# Patient Record
Sex: MALE | Race: WHITE | NOT HISPANIC OR LATINO | Employment: OTHER | URBAN - METROPOLITAN AREA
[De-identification: names, ages, dates, MRNs, and addresses within clinical notes are randomized per-mention and may not be internally consistent; named-entity substitution may affect disease eponyms.]

---

## 2017-01-03 RX ORDER — ALLOPURINOL 300 MG/1
TABLET ORAL
Qty: 90 TABLET | Refills: 3 | Status: SHIPPED | OUTPATIENT
Start: 2017-01-03

## 2017-01-03 RX ORDER — AMLODIPINE BESYLATE 10 MG/1
TABLET ORAL
Qty: 90 TABLET | Refills: 3 | Status: SHIPPED | OUTPATIENT
Start: 2017-01-03 | End: 2017-07-26 | Stop reason: ALTCHOICE

## 2017-01-17 ENCOUNTER — TELEPHONE (OUTPATIENT)
Dept: FAMILY MEDICINE CLINIC | Facility: CLINIC | Age: 72
End: 2017-01-17

## 2017-01-18 NOTE — TELEPHONE ENCOUNTER
Blood sugar is fine.  Heart rate is high, is BP okay?   Is he having symptoms of heart racing, light headed?   Chest pain? Shortness of breath?  Is his pulse irregular?

## 2017-01-23 ENCOUNTER — TELEPHONE (OUTPATIENT)
Dept: FAMILY MEDICINE CLINIC | Facility: CLINIC | Age: 72
End: 2017-01-23

## 2017-03-14 RX ORDER — PRAVASTATIN SODIUM 80 MG/1
TABLET ORAL
Qty: 90 TABLET | Refills: 3 | Status: SHIPPED | OUTPATIENT
Start: 2017-03-14

## 2017-04-21 DIAGNOSIS — E11.21 TYPE 2 DIABETES WITH NEPHROPATHY (HCC): ICD-10-CM

## 2017-04-21 RX ORDER — GLUCOSAMINE HCL/CHONDROITIN SU 500-400 MG
CAPSULE ORAL
Qty: 100 EACH | Refills: 12 | Status: SHIPPED | OUTPATIENT
Start: 2017-04-21

## 2017-04-21 RX ORDER — GLUCOSAMINE HCL/CHONDROITIN SU 500-400 MG
CAPSULE ORAL
Qty: 100 EACH | Refills: 12 | Status: SHIPPED | OUTPATIENT
Start: 2017-04-21 | End: 2017-04-21 | Stop reason: SDUPTHER

## 2017-04-26 ENCOUNTER — TELEPHONE (OUTPATIENT)
Dept: FAMILY MEDICINE CLINIC | Facility: CLINIC | Age: 72
End: 2017-04-26

## 2017-04-27 ENCOUNTER — TELEPHONE (OUTPATIENT)
Dept: FAMILY MEDICINE CLINIC | Facility: CLINIC | Age: 72
End: 2017-04-27

## 2017-06-26 RX ORDER — FUROSEMIDE 40 MG/1
TABLET ORAL
Qty: 180 TABLET | Refills: 3 | Status: SHIPPED | OUTPATIENT
Start: 2017-06-26 | End: 2017-10-16 | Stop reason: ALTCHOICE

## 2017-06-29 ENCOUNTER — OFFICE VISIT (OUTPATIENT)
Dept: FAMILY MEDICINE CLINIC | Facility: CLINIC | Age: 72
End: 2017-06-29

## 2017-06-29 VITALS
BODY MASS INDEX: 32.1 KG/M2 | TEMPERATURE: 97.8 F | SYSTOLIC BLOOD PRESSURE: 140 MMHG | HEART RATE: 82 BPM | WEIGHT: 250 LBS | DIASTOLIC BLOOD PRESSURE: 64 MMHG | OXYGEN SATURATION: 94 %

## 2017-06-29 DIAGNOSIS — N18.30 CHRONIC KIDNEY DISEASE, STAGE III (MODERATE) (HCC): ICD-10-CM

## 2017-06-29 DIAGNOSIS — N18.30 TYPE 2 DIABETES MELLITUS WITH STAGE 3 CHRONIC KIDNEY DISEASE, WITH LONG-TERM CURRENT USE OF INSULIN (HCC): Primary | ICD-10-CM

## 2017-06-29 DIAGNOSIS — I48.0 PAROXYSMAL ATRIAL FIBRILLATION (HCC): ICD-10-CM

## 2017-06-29 DIAGNOSIS — C34.90 NON-SMALL CELL CARCINOMA OF LUNG, STAGE 4, UNSPECIFIED LATERALITY (HCC): ICD-10-CM

## 2017-06-29 DIAGNOSIS — E11.22 TYPE 2 DIABETES MELLITUS WITH STAGE 3 CHRONIC KIDNEY DISEASE, WITH LONG-TERM CURRENT USE OF INSULIN (HCC): Primary | ICD-10-CM

## 2017-06-29 DIAGNOSIS — E78.5 DYSLIPIDEMIA: ICD-10-CM

## 2017-06-29 DIAGNOSIS — Z79.4 TYPE 2 DIABETES MELLITUS WITH STAGE 3 CHRONIC KIDNEY DISEASE, WITH LONG-TERM CURRENT USE OF INSULIN (HCC): Primary | ICD-10-CM

## 2017-06-29 DIAGNOSIS — I10 BENIGN ESSENTIAL HYPERTENSION: ICD-10-CM

## 2017-06-29 DIAGNOSIS — J44.9 COPD WITH CHRONIC BRONCHITIS (HCC): ICD-10-CM

## 2017-06-29 PROBLEM — D50.9 IRON DEFICIENCY ANEMIA: Status: ACTIVE | Noted: 2017-04-10

## 2017-06-29 PROBLEM — K90.9 IRON MALABSORPTION: Status: ACTIVE | Noted: 2017-04-10

## 2017-06-29 PROBLEM — R91.8 LUNG MASS: Status: ACTIVE | Noted: 2017-01-20

## 2017-06-29 PROBLEM — R93.1 ECHOCARDIOGRAM ABNORMAL: Status: ACTIVE | Noted: 2017-01-20

## 2017-06-29 PROBLEM — I31.39 PERICARDIAL EFFUSION: Status: ACTIVE | Noted: 2017-01-20

## 2017-06-29 PROBLEM — J90 PLEURAL EFFUSION: Status: ACTIVE | Noted: 2017-01-26

## 2017-06-29 PROBLEM — S37.009A INJURY OF KIDNEY: Status: ACTIVE | Noted: 2017-01-19

## 2017-06-29 LAB
CHOLEST SERPL-MCNC: 108 MG/DL (ref 0–200)
HBA1C MFR BLD: 6.9 % (ref 4.8–5.6)
HDLC SERPL-MCNC: 39 MG/DL (ref 40–60)
LDLC SERPL CALC-MCNC: 51 MG/DL (ref 0–100)
TRIGL SERPL-MCNC: 89 MG/DL (ref 0–150)
VLDLC SERPL CALC-MCNC: 17.8 MG/DL (ref 5–40)

## 2017-06-29 PROCEDURE — 99214 OFFICE O/P EST MOD 30 MIN: CPT | Performed by: FAMILY MEDICINE

## 2017-06-29 RX ORDER — WARFARIN SODIUM 5 MG/1
TABLET ORAL
Start: 2017-06-29 | End: 2017-08-25 | Stop reason: SDUPTHER

## 2017-06-29 NOTE — PROGRESS NOTES
Subjective   Jose Miguel Faustin is a 71 y.o. male. Presents today for   Chief Complaint   Patient presents with   • Follow-up     med check had pet scan x 1 week ago and lymph nodes in neck swollen and said it hasn't grown any.  Getting 4 more tx of chemo and then another scan.   • Diabetes   • Hyperlipidemia   • Hypertension   • Chronic Kidney Disease   • COPD     Has stage IV lung cancer non-small cell       Diabetes   He presents for his follow-up diabetic visit. He has type 2 diabetes mellitus. His disease course has been stable. Pertinent negatives for hypoglycemia include no dizziness, headaches, speech difficulty or tremors. Pertinent negatives for diabetes include no blurred vision, no chest pain, no foot paresthesias, no foot ulcerations and no weakness. Symptoms are stable. Diabetic complications include nephropathy (Sees Dr. Bailey for management.). Current diabetic treatment includes oral agent (dual therapy), diet and insulin injections. He is compliant with treatment all of the time. His weight is stable. He is following a diabetic diet. Home blood sugar record trend: 98 to 120. An ACE inhibitor/angiotensin II receptor blocker is not being taken.   Hypertension   This is a chronic problem. The current episode started more than 1 year ago. The problem is unchanged. The problem is controlled. Pertinent negatives include no blurred vision, chest pain, headaches, orthopnea, palpitations, peripheral edema, PND or shortness of breath. There are no associated agents to hypertension. Risk factors for coronary artery disease include dyslipidemia, diabetes mellitus and male gender. Past treatments include calcium channel blockers and direct vasodilators. The current treatment provides moderate improvement. Hypertensive end-organ damage includes kidney disease and heart failure. Identifiable causes of hypertension include chronic renal disease.   Hyperlipidemia   This is a chronic problem. The current episode started  more than 1 year ago. The problem is controlled. Recent lipid tests were reviewed and are normal. Exacerbating diseases include chronic renal disease and diabetes. There are no known factors aggravating his hyperlipidemia. Pertinent negatives include no chest pain, focal sensory loss, focal weakness, myalgias or shortness of breath. Current antihyperlipidemic treatment includes statins. The current treatment provides moderate improvement of lipids. There are no compliance problems.  Risk factors for coronary artery disease include dyslipidemia, diabetes mellitus, hypertension, male sex and obesity.   COPD   This is a chronic problem. The current episode started more than 1 year ago. The problem occurs intermittently. The problem has been unchanged. Associated symptoms include coughing. Pertinent negatives include no abdominal pain, chest pain, headaches, myalgias, nausea, numbness, vomiting or weakness. Associated symptoms comments: Breathing, cough stable;  Needs samples of spiriva, do not have;  Gave Stiolito. Treatments tried: on oxygen continuous now;  Neb and spiriva. The treatment provided moderate relief.   Atrial Fibrillation   Presents for follow-up visit. Symptoms are negative for bradycardia, chest pain, dizziness, hemodynamic instability, hypertension, hypotension, palpitations, shortness of breath, syncope and weakness. (ON coumadin, mgmt per cardiology) The symptoms have been stable. Past medical history includes atrial fibrillation and hyperlipidemia.       6/22/17 PET scan Skull base to midthigh  IMPRESSION:   1. Large right solid irregular hilar mass which is intensely hypermetabolic and is likely the patient's known primary neoplasm. There is fairly extensive middle mediastinal hypermetabolic lymphadenopathy as well as a hypermetabolic lymph node in the left   hilum. There is also hypermetabolic lymphadenopathy in the right neck in the posterior triangle and supraclavicular regions.  2. Air-fluid  level in the right maxillary sinus worrisome for acute sinusitis.  3. Sclerotic foci in the right iliac bone without associated increased tracer uptake. This could represent a bone island however a bone scan could be performed for further evaluation to exclude osseous metastasis.    Reports opted to not try new chemo as doesn't want to be sick and tolerating current treatment.    Review of Systems   Constitutional: Unexpected weight change: Weight gain or loss.   Eyes: Negative for blurred vision.   Respiratory: Positive for cough. Negative for shortness of breath and wheezing.    Cardiovascular: Negative for chest pain, palpitations, orthopnea, leg swelling, syncope and PND.   Gastrointestinal: Negative for abdominal pain, nausea and vomiting.   Musculoskeletal: Negative for myalgias.   Neurological: Negative for dizziness, tremors, focal weakness, syncope, facial asymmetry, speech difficulty, weakness, light-headedness, numbness and headaches.       The following portions of the patient's history were reviewed and updated as appropriate: allergies, current medications, past medical history and problem list.    Patient Active Problem List   Diagnosis   • Anemia   • Atrial fibrillation   • Benign essential hypertension   • Gastroesophageal reflux disease with esophagitis   • Chronic kidney disease, stage III (moderate)   • Congestive heart failure   • COPD with chronic bronchitis   • Type 2 diabetes mellitus with chronic kidney disease, with long-term current use of insulin   • Dry skin dermatitis   • Shortness of breath   • Edema   • Chronic gout of multiple sites   • Hyperlipidemia   • Hypoxia   • Adiposity   • Obstructive sleep apnea syndrome   • Pulmonary hypertension   • Reactive airway disease   • Tinea pedis   • Chronic obstructive pulmonary disease   • Left heart failure   • Venous stasis   • Injury of kidney   • Echocardiogram abnormal   • Iron deficiency anemia   • Pericardial effusion   • Lung mass   •  Iron malabsorption   • Non-small cell carcinoma of lung, stage 4   • Pleural effusion       No Known Allergies    Current Outpatient Prescriptions on File Prior to Visit   Medication Sig Dispense Refill   • albuterol (PROVENTIL) (2.5 MG/3ML) 0.083% nebulizer solution Albuterol Sulfate (2.5 MG/3ML) 0.083% Inhalation Nebulization Solution; Patient Sig: Albuterol Sulfate (2.5 MG/3ML) 0.083% Inhalation Nebulization Solution USE 1 UNIT DOSE IN NEBULIZER EVERY 4 TO 6 HOURS AS NEEDED.; 1; 11; 28-Apr-2014; Active     • allopurinol (ZYLOPRIM) 300 MG tablet TAKE 1 TABLET DAILY 90 tablet 3   • amLODIPine (NORVASC) 10 MG tablet TAKE 1 TABLET EVERY DAY 90 tablet 3   • BIOTIN 5000 PO Take  by mouth.     • Calcium Citrate-Vitamin D (CALCIUM CITRATE + D3 PO) Take  by mouth.     • Cholecalciferol (VITAMIN D3) 5000 UNITS capsule capsule Take 5,000 Units by mouth daily.     • Chromium-Cinnamon (CINNAMON PLUS CHROMIUM PO) Take  by mouth.     • Ferrous Gluconate 325 (36 FE) MG tablet Take  by mouth.     • furosemide (LASIX) 40 MG tablet TAKE 1 TABLET TWICE DAILY 180 tablet 3   • glipiZIDE (GLUCOTROL) 10 MG tablet TAKE 2 TABLETS BEFORE BREAKFAST AND TAKE 1 TABLET BEFORE SUPPER 270 tablet 3   • Glucosamine-Chondroit-Vit C-Mn (GLUCOSAMINE CHONDR 1500 COMPLX PO) Take  by mouth.     • Glucose Blood (BLOOD GLUCOSE TEST) strip Check twice daily. Disp Dm2 testing supplies as needs.  Dx: E11.9 Type 2 diabetes on long-term insulin use. NPI [id number]. 100 each 12   • glucose blood test strip 1 each by Other route 2 (Two) Times a Day. 100 each 3   • hydrALAZINE (APRESOLINE) 100 MG tablet Take 1 tablet by mouth.     • insulin glargine (LANTUS) 100 UNIT/ML injection Inject 8 Units under the skin every night (Patient taking differently: Inject 10 Units under the skin Every Night.) 1 each 12   • Lancet Devices (ACCU-CHEK SOFTCLIX) lancets Test bid 100 each 3   • Multiple Vitamins-Minerals (MULTIVITAMIN ADULT PO) Take  by mouth.     • Omega-3 Fatty  Acids (FISH OIL) 1000 MG capsule capsule Take  by mouth daily with breakfast.     • potassium chloride (K-DUR,KLOR-CON) 20 MEQ CR tablet take 1 tablet by mouth once daily  0   • pravastatin (PRAVACHOL) 80 MG tablet TAKE 1 TABLET EVERY DAY 90 tablet 3   • saxagliptin (ONGLYZA) 5 MG tablet Take  by mouth.     • tiotropium (SPIRIVA) 18 MCG per inhalation capsule daily.     • vitamin E 400 UNIT capsule Take 400 Units by mouth daily.       No current facility-administered medications on file prior to visit.        Objective   Vitals:    06/29/17 0757   BP: 140/64   Pulse: 82   Temp: 97.8 °F (36.6 °C)   SpO2: 94%   Weight: 250 lb (113 kg)       Physical Exam   Constitutional: He appears well-developed and well-nourished.   HENT:   Head: Normocephalic and atraumatic.   Neck: Neck supple. No JVD present. No thyromegaly present.   Cardiovascular: Normal rate, regular rhythm and normal heart sounds.  Exam reveals no gallop and no friction rub.    No murmur heard.  Pulmonary/Chest: Effort normal and breath sounds normal. No respiratory distress. He has no wheezes. He has no rales.   Abdominal: Soft. Bowel sounds are normal. He exhibits no distension. There is no tenderness. There is no rebound and no guarding.   Musculoskeletal: He exhibits no edema.   Neurological: He is alert.   Skin: Skin is warm and dry.   Psychiatric: He has a normal mood and affect. His behavior is normal.   Nursing note and vitals reviewed.    Thyroid Stimulating Hormone(TSH) (06/26/2017 9:22 AM)  Thyroid Stimulating Hormone(TSH) (06/26/2017 9:22 AM)   Component Value Ref Range   Thyroid Stimulating Hormone (TSH)3rd Gen 1.070 0.470 - 4.680 u(iU)/mL     Thyroid Stimulating Hormone(TSH) (06/26/2017 9:22 AM)   Specimen Performing Laboratory   Plasma - Blood [facility] ([id number])    [place] [address]     Back to top of Lab Results      Comprehensive Metabolic Panel (CMP) (06/26/2017 9:22 AM)  Comprehensive Metabolic Panel  (CMP) (06/26/2017 9:22 AM)   Component Value Ref Range   Sodium 140 137 - 145 mmol/L   Potassium 3.8 3.5 - 5.1 mmol/L   Chloride 103 98 - 107 mmol/L   Carbon Dioxide 29 22 - 30 mmol/L   Glucose 144 (H) 74 - 106 mg/dL   Blood Urea Nitrogen (BUN) 34 (H) 7 - 20 mg/dL   Creatinine-Blood 1.6 (H) 0.7 - 1.5 mg/dL   BUN/Creatinine Ratio 21.3 RATIO   Total Protein 6.4 6.3 - 8.2 g/dL   Albumin 3.4 (L) 3.5 - 5.0 g/dL   Globulin 3.0 1.5 - 4.5 g/dL   Albumin/Globulin Ratio 1.1 1.1 - 2.5 RATIO   Calcium 9.1 8.4 - 10.2 mg/dL   Total Bilirubin 0.6 0.2 - 1.3 mg/dL   AST/SGOT 21 15 - 46 U/L   ALT/SGPT 26 13 - 69 U/L   Alkaline Phosphatase 83 38 - 126 U/L   Estimated GFR 43 (L)Comment: Multiply by 1.212 if . Results provided are valid only for patients who are 18 to 70 years of age. Results do not take into account body mass. >60 /1.73 m2     Comprehensive Metabolic Panel (CMP) (06/26/2017 9:22 AM)   Specimen Performing Laboratory   [facility]    [address]     [address]    [place] [address]     Back to top of Lab Results      CBC w/Diff (06/26/2017 9:22 AM)  CBC w/Diff (06/26/2017 9:22 AM)   Component Value Ref Range   White Blood Count 5.98 4.5 - 11.0 10*3/uL   Red Blood Count 3.72 (L) 4.5 - 5.9 10*6/uL   Hemoglobin 10.9 (L) 13.5 - 17.5 g/dL   Hematocrit 34.4 (L) 41.0 - 53.0 %   Mean Corpuscular Volume 92.5 80.0 - 100.0 fL   Mean Corpuscular Hemoglobin 29.3 26.0 - 34.0 pg   Mean Corpuscular HGB Conc 31.7 31.0 - 37.0 g/dL   Red Cell Distribution Width-CV 16.7 12.0 - 16.8 %   Platelet Count 144 140 - 440 10*3/uL   Mean Platelet Volume 9.5 7.8 - 11.0 fL   Neutrophils % 67.5 45 - 80 %   Lymphocyte % 14.5 (L) 15 - 50 %   Monocyte % 10.0 0 - 15 %   Eosinophil% 7.5 (H) 0 - 7 %   BASO% 0.5 0 - 2 %   Neutrophil Abs 4.03 2.0 - 8.8 10*3/uL   Lymphocyte-Absolute 0.87 (L) 1.2 - 3.4 10*3/uL   Monocyte Absolute 0.60 0.0 - 1.7 10*3/uL   EOS-Absolute 0.45 0.0 - 0.8 10*3/uL   Basophil Abs 0.03 0.0 - 0.2  10*3/uL       Assessment/Plan   Jose Miguel was seen today for follow-up, diabetes, hyperlipidemia, hypertension, chronic kidney disease and copd.    Diagnoses and all orders for this visit:    Type 2 diabetes mellitus with stage 3 chronic kidney disease, with long-term current use of insulin  -     Lipid Panel  -     Hemoglobin A1c    Benign essential hypertension    COPD with chronic bronchitis    Non-small cell carcinoma of lung, stage 4, unspecified laterality    Chronic kidney disease, stage III (moderate)    Paroxysmal atrial fibrillation  -     warfarin (COUMADIN) 5 MG tablet; Currently on 7mg daily, mgmt by Cardiology    Dyslipidemia    -COPD - gave stilito 2 puffs daily; Continue neb;  F/u with oncology for lung cancer;  Holding weight  -dm2 - stable, add on a1c  -HLD - continue statin, recheck lipids.  -a. Fib stable  -hypertension - controlled, continue medications  -CKD - tight blood pressure, blood sugar control and avoid nsaids.           -Follow up: 6 months       Current Outpatient Prescriptions:   •  albuterol (PROVENTIL) (2.5 MG/3ML) 0.083% nebulizer solution, Albuterol Sulfate (2.5 MG/3ML) 0.083% Inhalation Nebulization Solution; Patient Sig: Albuterol Sulfate (2.5 MG/3ML) 0.083% Inhalation Nebulization Solution USE 1 UNIT DOSE IN NEBULIZER EVERY 4 TO 6 HOURS AS NEEDED.; 1; 11; 28-Apr-2014; Active, Disp: , Rfl:   •  allopurinol (ZYLOPRIM) 300 MG tablet, TAKE 1 TABLET DAILY, Disp: 90 tablet, Rfl: 3  •  amLODIPine (NORVASC) 10 MG tablet, TAKE 1 TABLET EVERY DAY, Disp: 90 tablet, Rfl: 3  •  BIOTIN 5000 PO, Take  by mouth., Disp: , Rfl:   •  Calcium Citrate-Vitamin D (CALCIUM CITRATE + D3 PO), Take  by mouth., Disp: , Rfl:   •  Cholecalciferol (VITAMIN D3) 5000 UNITS capsule capsule, Take 5,000 Units by mouth daily., Disp: , Rfl:   •  Chromium-Cinnamon (CINNAMON PLUS CHROMIUM PO), Take  by mouth., Disp: , Rfl:   •  Ferrous Gluconate 325 (36 FE) MG tablet, Take  by mouth., Disp: , Rfl:   •  furosemide  (LASIX) 40 MG tablet, TAKE 1 TABLET TWICE DAILY, Disp: 180 tablet, Rfl: 3  •  glipiZIDE (GLUCOTROL) 10 MG tablet, TAKE 2 TABLETS BEFORE BREAKFAST AND TAKE 1 TABLET BEFORE SUPPER, Disp: 270 tablet, Rfl: 3  •  Glucosamine-Chondroit-Vit C-Mn (GLUCOSAMINE CHONDR 1500 COMPLX PO), Take  by mouth., Disp: , Rfl:   •  Glucose Blood (BLOOD GLUCOSE TEST) strip, Check twice daily. Disp Dm2 testing supplies as needs.  Dx: E11.9 Type 2 diabetes on long-term insulin use. NPI [id number]., Disp: 100 each, Rfl: 12  •  glucose blood test strip, 1 each by Other route 2 (Two) Times a Day., Disp: 100 each, Rfl: 3  •  hydrALAZINE (APRESOLINE) 100 MG tablet, Take 1 tablet by mouth., Disp: , Rfl:   •  insulin glargine (LANTUS) 100 UNIT/ML injection, Inject 8 Units under the skin every night (Patient taking differently: Inject 10 Units under the skin Every Night.), Disp: 1 each, Rfl: 12  •  Lancet Devices (ACCU-CHEK SOFTCLIX) lancets, Test bid, Disp: 100 each, Rfl: 3  •  Multiple Vitamins-Minerals (MULTIVITAMIN ADULT PO), Take  by mouth., Disp: , Rfl:   •  Omega-3 Fatty Acids (FISH OIL) 1000 MG capsule capsule, Take  by mouth daily with breakfast., Disp: , Rfl:   •  potassium chloride (K-DUR,KLOR-CON) 20 MEQ CR tablet, take 1 tablet by mouth once daily, Disp: , Rfl: 0  •  pravastatin (PRAVACHOL) 80 MG tablet, TAKE 1 TABLET EVERY DAY, Disp: 90 tablet, Rfl: 3  •  saxagliptin (ONGLYZA) 5 MG tablet, Take  by mouth., Disp: , Rfl:   •  tiotropium (SPIRIVA) 18 MCG per inhalation capsule, daily., Disp: , Rfl:   •  vitamin E 400 UNIT capsule, Take 400 Units by mouth daily., Disp: , Rfl:   •  warfarin (COUMADIN) 5 MG tablet, Currently on 7mg daily, mgmt by Cardiology, Disp: , Rfl:

## 2017-06-30 ENCOUNTER — TELEPHONE (OUTPATIENT)
Dept: FAMILY MEDICINE CLINIC | Facility: CLINIC | Age: 72
End: 2017-06-30

## 2017-07-17 ENCOUNTER — TELEPHONE (OUTPATIENT)
Dept: FAMILY MEDICINE CLINIC | Facility: CLINIC | Age: 72
End: 2017-07-17

## 2017-07-25 ENCOUNTER — TELEPHONE (OUTPATIENT)
Dept: FAMILY MEDICINE CLINIC | Facility: CLINIC | Age: 72
End: 2017-07-25

## 2017-07-25 RX ORDER — DILTIAZEM HYDROCHLORIDE 60 MG/1
60 TABLET, FILM COATED ORAL 3 TIMES DAILY
Qty: 90 TABLET | Refills: 1 | Status: SHIPPED | OUTPATIENT
Start: 2017-07-25 | End: 2017-07-25 | Stop reason: SDUPTHER

## 2017-07-25 RX ORDER — DILTIAZEM HYDROCHLORIDE 60 MG/1
60 TABLET, FILM COATED ORAL 3 TIMES DAILY
Qty: 270 TABLET | Refills: 1 | Status: SHIPPED | OUTPATIENT
Start: 2017-07-25 | End: 2017-10-16 | Stop reason: ALTCHOICE

## 2017-07-25 NOTE — TELEPHONE ENCOUNTER
rx's sent per pt request and wife said he got weak in shower the other night and leg gave way and now is swollen and painful.  I told them to be here at 9 am to see dr aragon tomorrow. amanda

## 2017-07-26 ENCOUNTER — OFFICE VISIT (OUTPATIENT)
Dept: FAMILY MEDICINE CLINIC | Facility: CLINIC | Age: 72
End: 2017-07-26

## 2017-07-26 VITALS
HEART RATE: 63 BPM | WEIGHT: 250 LBS | OXYGEN SATURATION: 95 % | BODY MASS INDEX: 32.08 KG/M2 | SYSTOLIC BLOOD PRESSURE: 104 MMHG | TEMPERATURE: 97.7 F | DIASTOLIC BLOOD PRESSURE: 64 MMHG | HEIGHT: 74 IN

## 2017-07-26 DIAGNOSIS — I10 BENIGN ESSENTIAL HYPERTENSION: ICD-10-CM

## 2017-07-26 DIAGNOSIS — R06.02 SHORTNESS OF BREATH: Primary | ICD-10-CM

## 2017-07-26 DIAGNOSIS — C34.90 NON-SMALL CELL LUNG CANCER, UNSPECIFIED LATERALITY (HCC): ICD-10-CM

## 2017-07-26 DIAGNOSIS — Z79.01 CHRONIC ANTICOAGULATION: ICD-10-CM

## 2017-07-26 DIAGNOSIS — R79.1 SUBTHERAPEUTIC INTERNATIONAL NORMALIZED RATIO (INR): ICD-10-CM

## 2017-07-26 DIAGNOSIS — M79.89 SWELLING OF LOWER EXTREMITY: ICD-10-CM

## 2017-07-26 DIAGNOSIS — I50.33 ACUTE ON CHRONIC DIASTOLIC CONGESTIVE HEART FAILURE (HCC): ICD-10-CM

## 2017-07-26 LAB
HCT VFR BLDA CALC: 36.8 %
HGB BLDA-MCNC: 11.5 G/DL
INR PPP: 1.8 (ref 0.9–1.1)
MCH, POC: 28.1
MCHC, POC: 31.2
MCV, POC: 89.9
PLATELET # BLD AUTO: 155 10*3/MM3
PMV BLD: 6.6 FL
RBC, POC: 4.09
RDW, POC: 18.6
WBC # BLD: 6.7 10*3/UL

## 2017-07-26 PROCEDURE — 85610 PROTHROMBIN TIME: CPT | Performed by: FAMILY MEDICINE

## 2017-07-26 PROCEDURE — 99214 OFFICE O/P EST MOD 30 MIN: CPT | Performed by: FAMILY MEDICINE

## 2017-07-26 PROCEDURE — 85027 COMPLETE CBC AUTOMATED: CPT | Performed by: FAMILY MEDICINE

## 2017-07-26 PROCEDURE — 71020 XR CHEST PA AND LATERAL: CPT | Performed by: FAMILY MEDICINE

## 2017-07-26 PROCEDURE — 36416 COLLJ CAPILLARY BLOOD SPEC: CPT | Performed by: FAMILY MEDICINE

## 2017-07-26 RX ORDER — PROMETHAZINE HYDROCHLORIDE 25 MG/1
25 TABLET ORAL EVERY 6 HOURS PRN
COMMUNITY

## 2017-07-26 RX ORDER — DILTIAZEM HYDROCHLORIDE 60 MG/1
TABLET, FILM COATED ORAL
Qty: 90 TABLET | Refills: 0 | Status: SHIPPED | OUTPATIENT
Start: 2017-07-26 | End: 2017-07-26 | Stop reason: SDUPTHER

## 2017-07-26 RX ORDER — ONDANSETRON HYDROCHLORIDE 8 MG/1
TABLET, FILM COATED ORAL EVERY 8 HOURS PRN
COMMUNITY

## 2017-07-26 RX ORDER — UBIDECARENONE 75 MG
100 CAPSULE ORAL DAILY
COMMUNITY

## 2017-07-26 RX ORDER — RANITIDINE 150 MG/1
150 TABLET ORAL 2 TIMES DAILY
COMMUNITY

## 2017-07-27 LAB
ALBUMIN SERPL-MCNC: 3.8 G/DL (ref 3.5–5.2)
ALBUMIN/GLOB SERPL: 1.4 G/DL
ALP SERPL-CCNC: 71 U/L (ref 39–117)
ALT SERPL-CCNC: 18 U/L (ref 1–41)
AST SERPL-CCNC: 18 U/L (ref 1–40)
BILIRUB SERPL-MCNC: 0.6 MG/DL (ref 0.1–1.2)
BNP SERPL-MCNC: 282.3 PG/ML (ref 0–100)
BUN SERPL-MCNC: 37 MG/DL (ref 8–23)
BUN/CREAT SERPL: 20.7 (ref 7–25)
CALCIUM SERPL-MCNC: 10.1 MG/DL (ref 8.6–10.5)
CHLORIDE SERPL-SCNC: 100 MMOL/L (ref 98–107)
CO2 SERPL-SCNC: 26.8 MMOL/L (ref 22–29)
CREAT SERPL-MCNC: 1.79 MG/DL (ref 0.76–1.27)
GLOBULIN SER CALC-MCNC: 2.8 GM/DL
GLUCOSE SERPL-MCNC: 143 MG/DL (ref 65–99)
POTASSIUM SERPL-SCNC: 4.6 MMOL/L (ref 3.5–5.2)
PROT SERPL-MCNC: 6.6 G/DL (ref 6–8.5)
SODIUM SERPL-SCNC: 142 MMOL/L (ref 136–145)

## 2017-07-29 NOTE — PROGRESS NOTES
Call patient and check on.  Is patient feeling better?  Kidney function decline is slightly improved from prior checks.  Radiology was concerned with cxr unable to tell if pneumonia as has known mass.  His symptoms were more consistent with heart failure.

## 2017-08-11 ENCOUNTER — TELEPHONE (OUTPATIENT)
Dept: FAMILY MEDICINE CLINIC | Facility: CLINIC | Age: 72
End: 2017-08-11

## 2017-08-17 ENCOUNTER — OFFICE VISIT (OUTPATIENT)
Dept: FAMILY MEDICINE CLINIC | Facility: CLINIC | Age: 72
End: 2017-08-17

## 2017-08-17 VITALS
DIASTOLIC BLOOD PRESSURE: 70 MMHG | HEART RATE: 67 BPM | OXYGEN SATURATION: 96 % | WEIGHT: 244 LBS | SYSTOLIC BLOOD PRESSURE: 132 MMHG | BODY MASS INDEX: 31.33 KG/M2 | TEMPERATURE: 98.1 F

## 2017-08-17 DIAGNOSIS — N18.30 TYPE 2 DIABETES MELLITUS WITH STAGE 3 CHRONIC KIDNEY DISEASE, WITH LONG-TERM CURRENT USE OF INSULIN (HCC): Primary | ICD-10-CM

## 2017-08-17 DIAGNOSIS — Z79.4 TYPE 2 DIABETES MELLITUS WITH STAGE 3 CHRONIC KIDNEY DISEASE, WITH LONG-TERM CURRENT USE OF INSULIN (HCC): Primary | ICD-10-CM

## 2017-08-17 DIAGNOSIS — IMO0002 UNCONTROLLED TYPE 2 DIABETES MELLITUS WITH STAGE 3 CHRONIC KIDNEY DISEASE, WITH LONG-TERM CURRENT USE OF INSULIN: ICD-10-CM

## 2017-08-17 DIAGNOSIS — E11.22 TYPE 2 DIABETES MELLITUS WITH STAGE 3 CHRONIC KIDNEY DISEASE, WITH LONG-TERM CURRENT USE OF INSULIN (HCC): Primary | ICD-10-CM

## 2017-08-17 PROBLEM — C79.31 BRAIN METASTASES: Status: ACTIVE | Noted: 2017-08-10

## 2017-08-17 PROCEDURE — 99214 OFFICE O/P EST MOD 30 MIN: CPT | Performed by: FAMILY MEDICINE

## 2017-08-17 RX ORDER — DEXAMETHASONE 4 MG/1
4 TABLET ORAL
COMMUNITY
Start: 2017-08-10 | End: 2017-10-16

## 2017-08-17 NOTE — PROGRESS NOTES
Subjective   Jose Miguel Faustin is a 72 y.o. male. Presents today for   Chief Complaint   Patient presents with   • Follow-up     on steroid and blood sugars high, needs insulin regimen.  Weight loss and brain mets       History of Present Illness  Patient here for f/u of CHF exacerbation;  Edema resolved;  No cp or dyspnea more than baseline.  No pnd/orthopnea.  Unfortunately had sudden onset unilateral weakness of lower extremity and found to have met to brain from lung cancer.  Was started on dexamethazone and since then weakness greatly improved, walking normally again.  No f/c; no headaches; no syncope.  Reports this started within past couple of weeks;  Is to start radiation therapy soon.  In interim his blood sugars have risen quite significantly.  Is still on lower dose insulin as directed.  Patient has had diabetes for a few years, after massive weight loss had well controlled until now.    Review of Systems   Constitutional: Negative for chills and fever.   Respiratory: Positive for shortness of breath.    Cardiovascular: Negative for chest pain, palpitations and leg swelling.   Gastrointestinal: Negative for nausea and vomiting.   Endocrine: Positive for polydipsia and polyuria.   Neurological: Negative for dizziness, seizures, syncope, facial asymmetry, speech difficulty, light-headedness and headaches.       The following portions of the patient's history were reviewed and updated as appropriate: allergies, current medications, past medical history and problem list.    Patient Active Problem List   Diagnosis   • Anemia   • Atrial fibrillation   • Benign essential hypertension   • Gastroesophageal reflux disease with esophagitis   • Chronic kidney disease, stage III (moderate)   • Acute on chronic diastolic congestive heart failure   • COPD with chronic bronchitis   • Type 2 diabetes mellitus with chronic kidney disease, with long-term current use of insulin   • Dry skin dermatitis   • Shortness of breath    • Edema   • Chronic gout of multiple sites   • Hyperlipidemia   • Hypoxia   • Adiposity   • Obstructive sleep apnea syndrome   • Pulmonary hypertension   • Reactive airway disease   • Tinea pedis   • Chronic obstructive pulmonary disease   • Left heart failure   • Venous stasis   • Injury of kidney   • Echocardiogram abnormal   • Iron deficiency anemia   • Pericardial effusion   • Lung mass   • Iron malabsorption   • Non-small cell carcinoma of lung, stage 4   • Pleural effusion   • Brain metastases       No Known Allergies    Current Outpatient Prescriptions on File Prior to Visit   Medication Sig Dispense Refill   • albuterol (PROVENTIL) (2.5 MG/3ML) 0.083% nebulizer solution Albuterol Sulfate (2.5 MG/3ML) 0.083% Inhalation Nebulization Solution; Patient Sig: Albuterol Sulfate (2.5 MG/3ML) 0.083% Inhalation Nebulization Solution USE 1 UNIT DOSE IN NEBULIZER EVERY 4 TO 6 HOURS AS NEEDED.; 1; 11; 28-Apr-2014; Active     • allopurinol (ZYLOPRIM) 300 MG tablet TAKE 1 TABLET DAILY 90 tablet 3   • BIOTIN 5000 PO Take  by mouth.     • Calcium Citrate-Vitamin D (CALCIUM CITRATE + D3 PO) Take  by mouth.     • Cholecalciferol (VITAMIN D3) 5000 UNITS capsule capsule Take 5,000 Units by mouth daily.     • Chromium-Cinnamon (CINNAMON PLUS CHROMIUM PO) Take  by mouth.     • diltiaZEM (CARDIZEM) 60 MG tablet Take 1 tablet by mouth 3 (Three) Times a Day. 270 tablet 1   • furosemide (LASIX) 40 MG tablet TAKE 1 TABLET TWICE DAILY 180 tablet 3   • Glucosamine-Chondroit-Vit C-Mn (GLUCOSAMINE CHONDR 1500 COMPLX PO) Take  by mouth.     • Glucose Blood (BLOOD GLUCOSE TEST) strip Check twice daily. Disp Dm2 testing supplies as needs.  Dx: E11.9 Type 2 diabetes on long-term insulin use. NPI [id number]. 100 each 12   • glucose blood test strip 1 each by Other route 2 (Two) Times a Day. 100 each 3   • hydrALAZINE (APRESOLINE) 100 MG tablet Take 1 tablet by mouth 3 (Three) Times a Day.     • Lancet Devices (ACCU-CHEK SOFTCLIX) lancets Test  bid 100 each 3   • Multiple Vitamins-Minerals (MULTIVITAMIN ADULT PO) Take  by mouth.     • Omega-3 Fatty Acids (FISH OIL) 1000 MG capsule capsule Take  by mouth daily with breakfast.     • ondansetron (ZOFRAN) 8 MG tablet Take  by mouth Every 8 (Eight) Hours As Needed for Nausea or Vomiting.     • potassium chloride (K-DUR,KLOR-CON) 20 MEQ CR tablet take 1 tablet by mouth once daily  0   • pravastatin (PRAVACHOL) 80 MG tablet TAKE 1 TABLET EVERY DAY 90 tablet 3   • promethazine (PHENERGAN) 25 MG tablet Take 25 mg by mouth Every 6 (Six) Hours As Needed for Nausea or Vomiting.     • raNITIdine (ZANTAC) 150 MG tablet Take 150 mg by mouth 2 (Two) Times a Day.     • saxagliptin (ONGLYZA) 5 MG tablet Take  by mouth.     • tiotropium (SPIRIVA) 18 MCG per inhalation capsule daily.     • vitamin B-12 (CYANOCOBALAMIN) 100 MCG tablet Take 100 mcg by mouth Daily.     • vitamin E 400 UNIT capsule Take 400 Units by mouth daily.     • warfarin (COUMADIN) 5 MG tablet Currently on 7mg daily, mgmt by Cardiology     • [DISCONTINUED] insulin glargine (LANTUS) 100 UNIT/ML injection Inject 8 Units under the skin every night (Patient taking differently: Inject 10 Units under the skin Every Night.) 1 each 12     No current facility-administered medications on file prior to visit.        Objective   Vitals:    08/17/17 1250   BP: 132/70   Pulse: 67   Temp: 98.1 °F (36.7 °C)   SpO2: 96%   Weight: 244 lb (111 kg)       Physical Exam   Constitutional: He appears well-developed and well-nourished.   HENT:   Head: Normocephalic and atraumatic.   Neck: Neck supple. No JVD present. No thyromegaly present.   Cardiovascular: Normal rate, regular rhythm and normal heart sounds.  Exam reveals no gallop and no friction rub.    No murmur heard.  Pulmonary/Chest: Effort normal and breath sounds normal. No respiratory distress. He has no wheezes. He has no rales.   Abdominal: Soft. Bowel sounds are normal. He exhibits no distension. There is no  tenderness. There is no rebound and no guarding.   Musculoskeletal: He exhibits no edema.   Neurological: He is alert.   Skin: Skin is warm and dry.   Psychiatric: He has a normal mood and affect. His behavior is normal.   Nursing note and vitals reviewed.       Ref Range & Units 1mo ago     Hemoglobin A1C 4.80 - 5.60 % 6.90 (H)         Assessment/Plan   Jose Miguel was seen today for follow-up.    Diagnoses and all orders for this visit:    Type 2 diabetes mellitus with stage 3 chronic kidney disease, with long-term current use of insulin    Uncontrolled type 2 diabetes mellitus with stage 3 chronic kidney disease, with long-term current use of insulin  -     insulin glargine (LANTUS) 100 UNIT/ML injection; Inject 20 Units under the skin 2 (Two) Times a Day.    -will increase insulin, gave samples.  Patient to call if any blood sugars <100 or >200.  Will attempt to keep reasonably controlled on high dose steroids.  Will have call with blood sugar log and adjust dose based on how long remains on steroids.       -Follow up: 3 months       Current Outpatient Prescriptions:   •  albuterol (PROVENTIL) (2.5 MG/3ML) 0.083% nebulizer solution, Albuterol Sulfate (2.5 MG/3ML) 0.083% Inhalation Nebulization Solution; Patient Sig: Albuterol Sulfate (2.5 MG/3ML) 0.083% Inhalation Nebulization Solution USE 1 UNIT DOSE IN NEBULIZER EVERY 4 TO 6 HOURS AS NEEDED.; 1; 11; 28-Apr-2014; Active, Disp: , Rfl:   •  allopurinol (ZYLOPRIM) 300 MG tablet, TAKE 1 TABLET DAILY, Disp: 90 tablet, Rfl: 3  •  BIOTIN 5000 PO, Take  by mouth., Disp: , Rfl:   •  Calcium Citrate-Vitamin D (CALCIUM CITRATE + D3 PO), Take  by mouth., Disp: , Rfl:   •  Cholecalciferol (VITAMIN D3) 5000 UNITS capsule capsule, Take 5,000 Units by mouth daily., Disp: , Rfl:   •  Chromium-Cinnamon (CINNAMON PLUS CHROMIUM PO), Take  by mouth., Disp: , Rfl:   •  dexamethasone (DECADRON) 4 MG tablet, Take 4 mg by mouth., Disp: , Rfl:   •  diltiaZEM (CARDIZEM) 60 MG tablet, Take 1  tablet by mouth 3 (Three) Times a Day., Disp: 270 tablet, Rfl: 1  •  furosemide (LASIX) 40 MG tablet, TAKE 1 TABLET TWICE DAILY, Disp: 180 tablet, Rfl: 3  •  Glucosamine-Chondroit-Vit C-Mn (GLUCOSAMINE CHONDR 1500 COMPLX PO), Take  by mouth., Disp: , Rfl:   •  Glucose Blood (BLOOD GLUCOSE TEST) strip, Check twice daily. Disp Dm2 testing supplies as needs.  Dx: E11.9 Type 2 diabetes on long-term insulin use. NP [id number]., Disp: 100 each, Rfl: 12  •  glucose blood test strip, 1 each by Other route 2 (Two) Times a Day., Disp: 100 each, Rfl: 3  •  hydrALAZINE (APRESOLINE) 100 MG tablet, Take 1 tablet by mouth 3 (Three) Times a Day., Disp: , Rfl:   •  insulin glargine (LANTUS) 100 UNIT/ML injection, Inject 20 Units under the skin 2 (Two) Times a Day., Disp: 1 each, Rfl: 12  •  Lancet Devices (ACCU-CHEK SOFTCLIX) lancets, Test bid, Disp: 100 each, Rfl: 3  •  Multiple Vitamins-Minerals (MULTIVITAMIN ADULT PO), Take  by mouth., Disp: , Rfl:   •  Omega-3 Fatty Acids (FISH OIL) 1000 MG capsule capsule, Take  by mouth daily with breakfast., Disp: , Rfl:   •  ondansetron (ZOFRAN) 8 MG tablet, Take  by mouth Every 8 (Eight) Hours As Needed for Nausea or Vomiting., Disp: , Rfl:   •  potassium chloride (K-DUR,KLOR-CON) 20 MEQ CR tablet, take 1 tablet by mouth once daily, Disp: , Rfl: 0  •  pravastatin (PRAVACHOL) 80 MG tablet, TAKE 1 TABLET EVERY DAY, Disp: 90 tablet, Rfl: 3  •  promethazine (PHENERGAN) 25 MG tablet, Take 25 mg by mouth Every 6 (Six) Hours As Needed for Nausea or Vomiting., Disp: , Rfl:   •  raNITIdine (ZANTAC) 150 MG tablet, Take 150 mg by mouth 2 (Two) Times a Day., Disp: , Rfl:   •  saxagliptin (ONGLYZA) 5 MG tablet, Take  by mouth., Disp: , Rfl:   •  tiotropium (SPIRIVA) 18 MCG per inhalation capsule, daily., Disp: , Rfl:   •  vitamin B-12 (CYANOCOBALAMIN) 100 MCG tablet, Take 100 mcg by mouth Daily., Disp: , Rfl:   •  vitamin E 400 UNIT capsule, Take 400 Units by mouth daily., Disp: , Rfl:   •  warfarin  (COUMADIN) 5 MG tablet, Currently on 7mg daily, mgmt by Cardiology, Disp: , Rfl:

## 2017-08-23 RX ORDER — INSULIN GLARGINE 100 [IU]/ML
20 INJECTION, SOLUTION SUBCUTANEOUS 2 TIMES DAILY
Qty: 1 EACH | Refills: 12
Start: 2017-08-23

## 2017-08-25 ENCOUNTER — TELEPHONE (OUTPATIENT)
Dept: FAMILY MEDICINE CLINIC | Facility: CLINIC | Age: 72
End: 2017-08-25

## 2017-08-25 DIAGNOSIS — I48.0 PAROXYSMAL ATRIAL FIBRILLATION (HCC): ICD-10-CM

## 2017-08-25 RX ORDER — WARFARIN SODIUM 2 MG/1
2 TABLET ORAL
Qty: 90 TABLET | Refills: 1 | Status: SHIPPED | OUTPATIENT
Start: 2017-08-25

## 2017-08-25 RX ORDER — WARFARIN SODIUM 5 MG/1
TABLET ORAL
Qty: 90 TABLET | Refills: 1 | Status: SHIPPED | OUTPATIENT
Start: 2017-08-25

## 2017-09-05 ENCOUNTER — TELEPHONE (OUTPATIENT)
Dept: FAMILY MEDICINE CLINIC | Facility: CLINIC | Age: 72
End: 2017-09-05

## 2017-09-07 NOTE — TELEPHONE ENCOUNTER
Error, disregard message about omar. Patient wants to you to please keep an eye out for lantus samples to come in.

## 2017-09-08 ENCOUNTER — OFFICE VISIT (OUTPATIENT)
Dept: FAMILY MEDICINE CLINIC | Facility: CLINIC | Age: 72
End: 2017-09-08

## 2017-09-08 VITALS
HEIGHT: 74 IN | OXYGEN SATURATION: 99 % | BODY MASS INDEX: 30.16 KG/M2 | TEMPERATURE: 97.4 F | DIASTOLIC BLOOD PRESSURE: 66 MMHG | SYSTOLIC BLOOD PRESSURE: 138 MMHG | WEIGHT: 235 LBS | HEART RATE: 91 BPM

## 2017-09-08 DIAGNOSIS — C79.31 BRAIN METASTASES: ICD-10-CM

## 2017-09-08 DIAGNOSIS — Z79.4 TYPE 2 DIABETES MELLITUS WITH STAGE 3 CHRONIC KIDNEY DISEASE, WITH LONG-TERM CURRENT USE OF INSULIN (HCC): Primary | ICD-10-CM

## 2017-09-08 DIAGNOSIS — E11.22 TYPE 2 DIABETES MELLITUS WITH STAGE 3 CHRONIC KIDNEY DISEASE, WITH LONG-TERM CURRENT USE OF INSULIN (HCC): Primary | ICD-10-CM

## 2017-09-08 DIAGNOSIS — N18.30 TYPE 2 DIABETES MELLITUS WITH STAGE 3 CHRONIC KIDNEY DISEASE, WITH LONG-TERM CURRENT USE OF INSULIN (HCC): Primary | ICD-10-CM

## 2017-09-08 DIAGNOSIS — C34.90 NON-SMALL CELL CARCINOMA OF LUNG, STAGE 4, UNSPECIFIED LATERALITY (HCC): ICD-10-CM

## 2017-09-08 PROCEDURE — 99214 OFFICE O/P EST MOD 30 MIN: CPT | Performed by: FAMILY MEDICINE

## 2017-09-08 NOTE — PROGRESS NOTES
Subjective   Jose Miguel Faustin is a 72 y.o. male. Presents today for   Chief Complaint   Patient presents with   • Follow-up     pt here for follow up visit, he just finished radiation treatment       History of Present Illness  Patient here for f/u of dm2, has had for numerous years, is on insulin and still taking onglyza (needs samples of both);  Had been stable, but on steroids for brain mets from lung cancer which caused to become uncontrolled with BS's into 300s;  Completed radiation and to wean off steroids (has cut back), had f/u as blood sugars uncontrolled with steroid addition.  Reports feeling good overall, to restart chemo per patient with infusion in 3 days;   BS's on insulin increase to 20 units dropped;  No lows;  Cut yesterday to 15 units and this am fasting BS 89.  Eating less in general, but is eating.  Will have steroid burst with infusion.    No cp/soa;  No abd pain;  No dizziness or light headedness.   No syncope;  Has been trying to remain active.  No changes to surgical, pmhx since last seen;      Needs flu;  Had pneumovax at 63 yoa;  Prevnar 13 9/15/2015     Review of Systems   Respiratory: Negative for shortness of breath.    Cardiovascular: Negative for chest pain.   Gastrointestinal: Negative for abdominal pain, nausea and vomiting.   Neurological: Negative for dizziness, syncope and light-headedness.       The following portions of the patient's history were reviewed and updated as appropriate: allergies, current medications, past medical history and problem list.    Patient Active Problem List   Diagnosis   • Anemia   • Atrial fibrillation   • Benign essential hypertension   • Gastroesophageal reflux disease with esophagitis   • Chronic kidney disease, stage III (moderate)   • Acute on chronic diastolic congestive heart failure   • COPD with chronic bronchitis   • Type 2 diabetes mellitus with chronic kidney disease, with long-term current use of insulin   • Dry skin dermatitis   •  Shortness of breath   • Edema   • Chronic gout of multiple sites   • Hyperlipidemia   • Hypoxia   • Adiposity   • Obstructive sleep apnea syndrome   • Pulmonary hypertension   • Reactive airway disease   • Tinea pedis   • Chronic obstructive pulmonary disease   • Left heart failure   • Venous stasis   • Injury of kidney   • Echocardiogram abnormal   • Iron deficiency anemia   • Pericardial effusion   • Lung mass   • Iron malabsorption   • Non-small cell carcinoma of lung, stage 4   • Pleural effusion   • Brain metastases       No Known Allergies    Current Outpatient Prescriptions on File Prior to Visit   Medication Sig Dispense Refill   • albuterol (PROVENTIL) (2.5 MG/3ML) 0.083% nebulizer solution Albuterol Sulfate (2.5 MG/3ML) 0.083% Inhalation Nebulization Solution; Patient Sig: Albuterol Sulfate (2.5 MG/3ML) 0.083% Inhalation Nebulization Solution USE 1 UNIT DOSE IN NEBULIZER EVERY 4 TO 6 HOURS AS NEEDED.; 1; 11; 28-Apr-2014; Active     • allopurinol (ZYLOPRIM) 300 MG tablet TAKE 1 TABLET DAILY 90 tablet 3   • BIOTIN 5000 PO Take  by mouth.     • Calcium Citrate-Vitamin D (CALCIUM CITRATE + D3 PO) Take  by mouth.     • Cholecalciferol (VITAMIN D3) 5000 UNITS capsule capsule Take 5,000 Units by mouth daily.     • Chromium-Cinnamon (CINNAMON PLUS CHROMIUM PO) Take  by mouth.     • dexamethasone (DECADRON) 4 MG tablet Take 4 mg by mouth.     • diltiaZEM (CARDIZEM) 60 MG tablet Take 1 tablet by mouth 3 (Three) Times a Day. 270 tablet 1   • furosemide (LASIX) 40 MG tablet TAKE 1 TABLET TWICE DAILY 180 tablet 3   • Glucosamine-Chondroit-Vit C-Mn (GLUCOSAMINE CHONDR 1500 COMPLX PO) Take  by mouth.     • Glucose Blood (BLOOD GLUCOSE TEST) strip Check twice daily. Disp Dm2 testing supplies as needs.  Dx: E11.9 Type 2 diabetes on long-term insulin use. NPI [id number]. 100 each 12   • glucose blood test strip 1 each by Other route 2 (Two) Times a Day. 100 each 3   • hydrALAZINE (APRESOLINE) 100 MG tablet Take 1 tablet by  "mouth 3 (Three) Times a Day.     • insulin glargine (LANTUS) 100 UNIT/ML injection Inject 20 Units under the skin 2 (Two) Times a Day. 1 each 12   • Lancet Devices (ACCU-CHEK SOFTCLIX) lancets Test bid 100 each 3   • Multiple Vitamins-Minerals (MULTIVITAMIN ADULT PO) Take  by mouth.     • Omega-3 Fatty Acids (FISH OIL) 1000 MG capsule capsule Take  by mouth daily with breakfast.     • ondansetron (ZOFRAN) 8 MG tablet Take  by mouth Every 8 (Eight) Hours As Needed for Nausea or Vomiting.     • potassium chloride (K-DUR,KLOR-CON) 20 MEQ CR tablet take 1 tablet by mouth once daily  0   • pravastatin (PRAVACHOL) 80 MG tablet TAKE 1 TABLET EVERY DAY 90 tablet 3   • promethazine (PHENERGAN) 25 MG tablet Take 25 mg by mouth Every 6 (Six) Hours As Needed for Nausea or Vomiting.     • raNITIdine (ZANTAC) 150 MG tablet Take 150 mg by mouth 2 (Two) Times a Day.     • saxagliptin (ONGLYZA) 5 MG tablet Take  by mouth.     • tiotropium (SPIRIVA) 18 MCG per inhalation capsule daily.     • vitamin B-12 (CYANOCOBALAMIN) 100 MCG tablet Take 100 mcg by mouth Daily.     • vitamin E 400 UNIT capsule Take 400 Units by mouth daily.     • warfarin (COUMADIN) 2 MG tablet Take 1 tablet by mouth Daily. 90 tablet 1   • warfarin (COUMADIN) 5 MG tablet Currently on 7mg daily, mgmt by Cardiology 90 tablet 1     No current facility-administered medications on file prior to visit.        Objective   Vitals:    09/08/17 0940   BP: 138/66   BP Location: Left arm   Patient Position: Sitting   Cuff Size: Large Adult   Pulse: 91   Temp: 97.4 °F (36.3 °C)   TempSrc: Oral   SpO2: 99%   Weight: 235 lb (107 kg)   Height: 74\" (188 cm)       Physical Exam   Constitutional: He appears well-developed and well-nourished.   HENT:   Head: Normocephalic and atraumatic.   Neck: Neck supple. No JVD present. No thyromegaly present.   Cardiovascular: Normal rate, regular rhythm and normal heart sounds.  Exam reveals no gallop and no friction rub.    No murmur " heard.  Pulmonary/Chest: Effort normal and breath sounds normal. No respiratory distress. He has no wheezes. He has no rales.   Abdominal: Soft. Bowel sounds are normal. He exhibits no distension. There is no tenderness. There is no rebound and no guarding.   Musculoskeletal: He exhibits no edema.   Neurological: He is alert.   Generalized weakness;  Slow gait   Skin: Skin is warm and dry.   Psychiatric: He has a normal mood and affect. His behavior is normal.   Nursing note and vitals reviewed.      Assessment/Plan   Jose Miguel was seen today for follow-up.    Diagnoses and all orders for this visit:    Type 2 diabetes mellitus with stage 3 chronic kidney disease, with long-term current use of insulin    Non-small cell carcinoma of lung, stage 4, unspecified laterality    Brain metastases      Change lantus to 12 units nightly, except night has chemo infusion to give 15 units x 2 nights then back to 12 units nightly;  Goal BS's to just keep below 180.  Call if any low blood sugars.  Continue small frequent meals, recommend glucerna between meals  Needs insulin and needle samples along with onglyza, gave all (basaglar for lantus as do not have lantus as not sampled).       -Follow up: 3 months       Current Outpatient Prescriptions:   •  albuterol (PROVENTIL) (2.5 MG/3ML) 0.083% nebulizer solution, Albuterol Sulfate (2.5 MG/3ML) 0.083% Inhalation Nebulization Solution; Patient Sig: Albuterol Sulfate (2.5 MG/3ML) 0.083% Inhalation Nebulization Solution USE 1 UNIT DOSE IN NEBULIZER EVERY 4 TO 6 HOURS AS NEEDED.; 1; 11; 28-Apr-2014; Active, Disp: , Rfl:   •  allopurinol (ZYLOPRIM) 300 MG tablet, TAKE 1 TABLET DAILY, Disp: 90 tablet, Rfl: 3  •  BIOTIN 5000 PO, Take  by mouth., Disp: , Rfl:   •  Calcium Citrate-Vitamin D (CALCIUM CITRATE + D3 PO), Take  by mouth., Disp: , Rfl:   •  Cholecalciferol (VITAMIN D3) 5000 UNITS capsule capsule, Take 5,000 Units by mouth daily., Disp: , Rfl:   •  Chromium-Cinnamon (CINNAMON PLUS  CHROMIUM PO), Take  by mouth., Disp: , Rfl:   •  dexamethasone (DECADRON) 4 MG tablet, Take 4 mg by mouth., Disp: , Rfl:   •  diltiaZEM (CARDIZEM) 60 MG tablet, Take 1 tablet by mouth 3 (Three) Times a Day., Disp: 270 tablet, Rfl: 1  •  furosemide (LASIX) 40 MG tablet, TAKE 1 TABLET TWICE DAILY, Disp: 180 tablet, Rfl: 3  •  Glucosamine-Chondroit-Vit C-Mn (GLUCOSAMINE CHONDR 1500 COMPLX PO), Take  by mouth., Disp: , Rfl:   •  Glucose Blood (BLOOD GLUCOSE TEST) strip, Check twice daily. Disp Dm2 testing supplies as needs.  Dx: E11.9 Type 2 diabetes on long-term insulin use. NPI [id number]., Disp: 100 each, Rfl: 12  •  glucose blood test strip, 1 each by Other route 2 (Two) Times a Day., Disp: 100 each, Rfl: 3  •  hydrALAZINE (APRESOLINE) 100 MG tablet, Take 1 tablet by mouth 3 (Three) Times a Day., Disp: , Rfl:   •  insulin glargine (LANTUS) 100 UNIT/ML injection, Inject 20 Units under the skin 2 (Two) Times a Day., Disp: 1 each, Rfl: 12  •  Lancet Devices (ACCU-CHEK SOFTCLIX) lancets, Test bid, Disp: 100 each, Rfl: 3  •  Multiple Vitamins-Minerals (MULTIVITAMIN ADULT PO), Take  by mouth., Disp: , Rfl:   •  Omega-3 Fatty Acids (FISH OIL) 1000 MG capsule capsule, Take  by mouth daily with breakfast., Disp: , Rfl:   •  ondansetron (ZOFRAN) 8 MG tablet, Take  by mouth Every 8 (Eight) Hours As Needed for Nausea or Vomiting., Disp: , Rfl:   •  potassium chloride (K-DUR,KLOR-CON) 20 MEQ CR tablet, take 1 tablet by mouth once daily, Disp: , Rfl: 0  •  pravastatin (PRAVACHOL) 80 MG tablet, TAKE 1 TABLET EVERY DAY, Disp: 90 tablet, Rfl: 3  •  promethazine (PHENERGAN) 25 MG tablet, Take 25 mg by mouth Every 6 (Six) Hours As Needed for Nausea or Vomiting., Disp: , Rfl:   •  raNITIdine (ZANTAC) 150 MG tablet, Take 150 mg by mouth 2 (Two) Times a Day., Disp: , Rfl:   •  saxagliptin (ONGLYZA) 5 MG tablet, Take  by mouth., Disp: , Rfl:   •  tiotropium (SPIRIVA) 18 MCG per inhalation capsule, daily., Disp: , Rfl:   •  vitamin B-12  (CYANOCOBALAMIN) 100 MCG tablet, Take 100 mcg by mouth Daily., Disp: , Rfl:   •  vitamin E 400 UNIT capsule, Take 400 Units by mouth daily., Disp: , Rfl:   •  warfarin (COUMADIN) 2 MG tablet, Take 1 tablet by mouth Daily., Disp: 90 tablet, Rfl: 1  •  warfarin (COUMADIN) 5 MG tablet, Currently on 7mg daily, mgmt by Cardiology, Disp: 90 tablet, Rfl: 1

## 2017-10-16 ENCOUNTER — OFFICE VISIT (OUTPATIENT)
Dept: FAMILY MEDICINE CLINIC | Facility: CLINIC | Age: 72
End: 2017-10-16

## 2017-10-16 VITALS
WEIGHT: 230 LBS | SYSTOLIC BLOOD PRESSURE: 90 MMHG | HEART RATE: 65 BPM | OXYGEN SATURATION: 99 % | DIASTOLIC BLOOD PRESSURE: 44 MMHG | TEMPERATURE: 97.8 F | BODY MASS INDEX: 29.52 KG/M2 | HEIGHT: 74 IN

## 2017-10-16 DIAGNOSIS — I82.493 ACUTE DEEP VEIN THROMBOSIS (DVT) OF OTHER SPECIFIED VEIN OF BOTH LOWER EXTREMITIES (HCC): ICD-10-CM

## 2017-10-16 DIAGNOSIS — L85.3 DRY SKIN DERMATITIS: ICD-10-CM

## 2017-10-16 DIAGNOSIS — I50.33 ACUTE ON CHRONIC DIASTOLIC CONGESTIVE HEART FAILURE (HCC): Primary | ICD-10-CM

## 2017-10-16 PROBLEM — Z79.01 CHRONIC ANTICOAGULATION: Status: ACTIVE | Noted: 2017-10-16

## 2017-10-16 PROBLEM — J15.6 PNEUMONIA DUE TO GRAM-NEGATIVE BACTERIA (HCC): Status: ACTIVE | Noted: 2017-10-03

## 2017-10-16 PROBLEM — I82.403 DVT OF LOWER EXTREMITY, BILATERAL (HCC): Status: ACTIVE | Noted: 2017-09-26

## 2017-10-16 PROBLEM — J96.11 CHRONIC RESPIRATORY FAILURE WITH HYPOXIA (HCC): Status: ACTIVE | Noted: 2017-10-16

## 2017-10-16 PROBLEM — I50.21 SYSTOLIC CHF, ACUTE (HCC): Status: ACTIVE | Noted: 2017-10-02

## 2017-10-16 PROCEDURE — 99213 OFFICE O/P EST LOW 20 MIN: CPT | Performed by: FAMILY MEDICINE

## 2017-10-16 RX ORDER — FUROSEMIDE 80 MG
80 TABLET ORAL 2 TIMES DAILY
COMMUNITY

## 2017-10-16 RX ORDER — CARVEDILOL 6.25 MG/1
6.25 TABLET ORAL
COMMUNITY
Start: 2017-10-05 | End: 2017-10-17 | Stop reason: SDUPTHER

## 2017-10-16 RX ORDER — UREA 40 %
CREAM (GRAM) TOPICAL
Qty: 198 G | Refills: 5 | Status: SHIPPED | OUTPATIENT
Start: 2017-10-16

## 2017-10-16 RX ORDER — POTASSIUM CHLORIDE 1500 MG/1
20 TABLET, FILM COATED, EXTENDED RELEASE ORAL 2 TIMES DAILY
COMMUNITY
Start: 2017-10-05

## 2017-10-16 RX ORDER — FLUTICASONE PROPIONATE 50 MCG
1 SPRAY, SUSPENSION (ML) NASAL
COMMUNITY
Start: 2017-10-13

## 2017-10-16 RX ORDER — HYDROCODONE BITARTRATE AND ACETAMINOPHEN 5; 325 MG/1; MG/1
1 TABLET ORAL
COMMUNITY
Start: 2017-10-05

## 2017-10-16 NOTE — PROGRESS NOTES
Subjective   Jose Miguel Faustin is a 72 y.o. male. Presents today for   Chief Complaint   Patient presents with   • Congestive Heart Failure     pt here for hospital follow up. he went to [facility]   Here for hospital f/u;    Admit date: 9/26/2017  Discharge date and time: 10/5/2017    Patient presents with   • Leg Swelling   bilat feet and leg swelling started 3 weeks ago, seen by PCP and lasix doubled, still not having decrease in swelling. Hx CHF.     Discharge Diagnoses:   1. Principal Problem:  2. Systolic CHF, acute (HCC) POA: Yes  3. Active Problems:  4. Pneumonia due to gram-negative bacteria (HCC) POA: No  5. Type 2 diabetes mellitus with chronic kidney disease, with long-term current use of insulin (HCC) POA: Not Applicable  6. Non-small cell carcinoma of lung, stage 4 (HCC) POA: Yes  7. Chronic kidney disease, stage III (moderate) POA: Yes  8. Chronic obstructive pulmonary disease (HCC) POA: Yes  9. Chronic anticoagulation POA: Not Applicable  10. Diabetes mellitus, type II, insulin dependent (HCC) POA: Not Applicable  11. Chronic respiratory failure with hypoxia (HCC) POA: Yes  12. Benign essential hypertension POA: Yes  13. Hyperlipidemia POA: Yes  14. Gastroesophageal reflux disease with esophagitis POA: Yes  15. Benign essential HTN POA: Yes  16. DVT of lower extremity, bilateral (HCC) POA: Yes  17. Echocardiogram abnormal (9-25-17): EF=39%, mild AS POA: Yes  18. Permanent atrial fibrillation (HCC) POA: Yes    STOP taking these medications     saxagliptin (ONGLYZA) 5 MG TABS     Procedures:  1. 2-D echo: Mildly enlarged LV size with EF 35-40%. RV was normal in size and function. Mild aortic stenosis. Moderate MR, mild TR. RVSP 27 mmHg. No pericardial effusion.  2. BLE venous Doppler: BLE acute nonocclusive DVT involving gastrocnemius vein.  3. CT chest noncontrast: Large mass involving right upper lobe posteriorly with associated right hilar and subcarinal adenopathy similar in size in  comparison to previous examination. Mild differences in measurement likely related to surrounding infiltrate which has not increased which is not differentiated from malignancy on this noncontrasted study. Interval increase in scattered groundglass infiltrates in the right upper lobe as well as along the right area hilar location. Mild infiltrate in the right lower lobe. Small right and tiny left pleural effusion. Emphysematous changes and a nodular thyroid.  4. Blood cultures: Preliminary negative    Hospital Course:   Patient 73 y/o with primarily lung malignancy with mets presented to [facility] ER in volume overload and lower extremity swelling.  Found to have acute gastrocnemius dvt, INR subtx, and was bridged with heparin.  ON coumadin with hx of a. Fib with RVR;   Was diuresed and released with [facility] and f/u.      Congestive Heart Failure   Presents for initial visit. The disease course has been stable. The condition has lasted for 1 month. Associated symptoms include shortness of breath and unexpected weight change (progresssively losing weight). Pertinent negatives include no chest pain, chest pressure, edema, near-syncope, orthopnea, palpitations or paroxysmal nocturnal dyspnea. The symptoms have been improving. Past treatments include salt and fluid restriction. The treatment provided moderate relief. Compliance with prior treatments has been good. His past medical history is significant for arrhythmia, chronic lung disease, DM and HTN. (On coumadin for a. fib, mgmt by cardiology;)     Toe nail loose, catching on socks;  Skin cracking.   Restarted chemo last week;  Reports doing okay, though very fatigued.   Hx of dm2, 80s to 150 now;  Was high on steroids, but off now.  Hx of mets to brain, completed radiation;    Review of Systems   Constitutional: Positive for unexpected weight change (progresssively losing weight).   Respiratory: Positive for shortness of breath.    Cardiovascular: Negative for chest  pain, palpitations and near-syncope.       The following portions of the patient's history were reviewed and updated as appropriate: allergies, current medications, past medical history and problem list.    Patient Active Problem List   Diagnosis   • Anemia   • Atrial fibrillation   • Benign essential hypertension   • Gastroesophageal reflux disease with esophagitis   • Chronic kidney disease, stage III (moderate)   • Acute on chronic diastolic congestive heart failure   • COPD with chronic bronchitis   • Type 2 diabetes mellitus with chronic kidney disease, with long-term current use of insulin   • Dry skin dermatitis   • Shortness of breath   • Edema   • Chronic gout of multiple sites   • Hyperlipidemia   • Hypoxia   • Adiposity   • Obstructive sleep apnea syndrome   • Pulmonary hypertension   • Reactive airway disease   • Tinea pedis   • Chronic obstructive pulmonary disease   • Left heart failure   • Venous stasis   • Injury of kidney   • Echocardiogram abnormal   • Iron deficiency anemia   • Pericardial effusion   • Lung mass   • Iron malabsorption   • Non-small cell carcinoma of lung, stage 4   • Pleural effusion   • Brain metastases   • Chronic anticoagulation   • Chronic respiratory failure with hypoxia   • DVT of lower extremity, bilateral   • Pneumonia due to gram-negative bacteria   • Systolic CHF, acute       No Known Allergies    Current Outpatient Prescriptions on File Prior to Visit   Medication Sig Dispense Refill   • albuterol (PROVENTIL) (2.5 MG/3ML) 0.083% nebulizer solution Albuterol Sulfate (2.5 MG/3ML) 0.083% Inhalation Nebulization Solution; Patient Sig: Albuterol Sulfate (2.5 MG/3ML) 0.083% Inhalation Nebulization Solution USE 1 UNIT DOSE IN NEBULIZER EVERY 4 TO 6 HOURS AS NEEDED.; 1; 11; 28-Apr-2014; Active     • allopurinol (ZYLOPRIM) 300 MG tablet TAKE 1 TABLET DAILY 90 tablet 3   • BIOTIN 5000 PO Take  by mouth.     • Calcium Citrate-Vitamin D (CALCIUM CITRATE + D3 PO) Take  by mouth.      • Cholecalciferol (VITAMIN D3) 5000 UNITS capsule capsule Take 5,000 Units by mouth daily.     • Chromium-Cinnamon (CINNAMON PLUS CHROMIUM PO) Take  by mouth.     • Glucosamine-Chondroit-Vit C-Mn (GLUCOSAMINE CHONDR 1500 COMPLX PO) Take  by mouth.     • Glucose Blood (BLOOD GLUCOSE TEST) strip Check twice daily. Disp Dm2 testing supplies as needs.  Dx: E11.9 Type 2 diabetes on long-term insulin use. NPI [id number]. 100 each 12   • glucose blood test strip 1 each by Other route 2 (Two) Times a Day. 100 each 3   • insulin glargine (LANTUS) 100 UNIT/ML injection Inject 20 Units under the skin 2 (Two) Times a Day. (Patient taking differently: Inject 10 Units under the skin Every Night.) 1 each 12   • Lancet Devices (ACCU-CHEK SOFTCLIX) lancets Test bid 100 each 3   • Multiple Vitamins-Minerals (MULTIVITAMIN ADULT PO) Take  by mouth.     • Omega-3 Fatty Acids (FISH OIL) 1000 MG capsule capsule Take  by mouth daily with breakfast.     • ondansetron (ZOFRAN) 8 MG tablet Take  by mouth Every 8 (Eight) Hours As Needed for Nausea or Vomiting.     • pravastatin (PRAVACHOL) 80 MG tablet TAKE 1 TABLET EVERY DAY 90 tablet 3   • promethazine (PHENERGAN) 25 MG tablet Take 25 mg by mouth Every 6 (Six) Hours As Needed for Nausea or Vomiting.     • raNITIdine (ZANTAC) 150 MG tablet Take 150 mg by mouth 2 (Two) Times a Day.     • tiotropium (SPIRIVA) 18 MCG per inhalation capsule daily.     • vitamin B-12 (CYANOCOBALAMIN) 100 MCG tablet Take 100 mcg by mouth Daily.     • vitamin E 400 UNIT capsule Take 400 Units by mouth daily.     • warfarin (COUMADIN) 2 MG tablet Take 1 tablet by mouth Daily. 90 tablet 1   • warfarin (COUMADIN) 5 MG tablet Currently on 7mg daily, mgmt by Cardiology 90 tablet 1   • [DISCONTINUED] dexamethasone (DECADRON) 4 MG tablet Take 4 mg by mouth.     • [DISCONTINUED] diltiaZEM (CARDIZEM) 60 MG tablet Take 1 tablet by mouth 3 (Three) Times a Day. 270 tablet 1   • [DISCONTINUED] furosemide (LASIX) 40 MG tablet  "TAKE 1 TABLET TWICE DAILY 180 tablet 3   • [DISCONTINUED] hydrALAZINE (APRESOLINE) 100 MG tablet Take 1 tablet by mouth 3 (Three) Times a Day.     • [DISCONTINUED] potassium chloride (K-DUR,KLOR-CON) 20 MEQ CR tablet take 1 tablet by mouth once daily  0   • [DISCONTINUED] saxagliptin (ONGLYZA) 5 MG tablet Take  by mouth.       No current facility-administered medications on file prior to visit.        Objective   Vitals:    10/16/17 1209   BP: 90/44   BP Location: Right arm   Patient Position: Sitting   Cuff Size: Large Adult   Pulse: 65   Temp: 97.8 °F (36.6 °C)   TempSrc: Oral   SpO2: 99%   Weight: 230 lb (104 kg)   Height: 74\" (188 cm)       Physical Exam   Constitutional: He appears well-developed and well-nourished.   HENT:   Head: Normocephalic and atraumatic.   Neck: Neck supple. No JVD present. No thyromegaly present.   Cardiovascular: Normal rate, regular rhythm and normal heart sounds.  Exam reveals no gallop and no friction rub.    No murmur heard.  Pulmonary/Chest: Effort normal and breath sounds normal. No respiratory distress. He has no wheezes. He has no rales.   Abdominal: Soft. Bowel sounds are normal. He exhibits no distension. There is no tenderness. There is no rebound and no guarding.   Musculoskeletal: He exhibits no edema.   Neurological: He is alert.   Skin: Skin is warm and dry.   Right toe nails thickened;  Severe skin cracking of feet   Psychiatric: He has a normal mood and affect. His behavior is normal.   Nursing note and vitals reviewed.      Assessment/Plan   Jose Miguel was seen today for congestive heart failure.    Diagnoses and all orders for this visit:    Acute on chronic diastolic congestive heart failure    Dry skin dermatitis  -     urea (CARMOL) 40 % cream; Apply  topically Daily. To both feet    Acute deep vein thrombosis (DVT) of other specified vein of both lower extremities    -continue medications as per cardiology; avoid salt;    -will give keratolytic  -on coumadin, mgmt " per cardiology  -patient with stage IV lung cancer, feels overall doing okay; no pain issues;   Had brain mets, s/p radiation and is doing better with ambulation.   I discussed end of life care at length and hospice education for future reference and if ever needs.  He feels doing ok overall and tolerating treatment of which wishes to continue.       -Follow up: 3 months       Current Outpatient Prescriptions:   •  albuterol (PROVENTIL) (2.5 MG/3ML) 0.083% nebulizer solution, Albuterol Sulfate (2.5 MG/3ML) 0.083% Inhalation Nebulization Solution; Patient Sig: Albuterol Sulfate (2.5 MG/3ML) 0.083% Inhalation Nebulization Solution USE 1 UNIT DOSE IN NEBULIZER EVERY 4 TO 6 HOURS AS NEEDED.; 1; 11; 28-Apr-2014; Active, Disp: , Rfl:   •  allopurinol (ZYLOPRIM) 300 MG tablet, TAKE 1 TABLET DAILY, Disp: 90 tablet, Rfl: 3  •  BIOTIN 5000 PO, Take  by mouth., Disp: , Rfl:   •  Calcium Citrate-Vitamin D (CALCIUM CITRATE + D3 PO), Take  by mouth., Disp: , Rfl:   •  carvedilol (COREG) 6.25 MG tablet, Take 6.25 mg by mouth., Disp: , Rfl:   •  Cholecalciferol (VITAMIN D3) 5000 UNITS capsule capsule, Take 5,000 Units by mouth daily., Disp: , Rfl:   •  Chromium-Cinnamon (CINNAMON PLUS CHROMIUM PO), Take  by mouth., Disp: , Rfl:   •  diltiaZEM (CARDIZEM) 30 MG tablet, Take 30 mg by mouth., Disp: , Rfl:   •  fluticasone (FLONASE) 50 MCG/ACT nasal spray, 1 spray into each nostril., Disp: , Rfl:   •  furosemide (LASIX) 80 MG tablet, Take 80 mg by mouth 2 (Two) Times a Day., Disp: , Rfl:   •  Glucosamine-Chondroit-Vit C-Mn (GLUCOSAMINE CHONDR 1500 COMPLX PO), Take  by mouth., Disp: , Rfl:   •  Glucose Blood (BLOOD GLUCOSE TEST) strip, Check twice daily. Disp Dm2 testing supplies as needs.  Dx: E11.9 Type 2 diabetes on long-term insulin use. NPI [id number]., Disp: 100 each, Rfl: 12  •  glucose blood test strip, 1 each by Other route 2 (Two) Times a Day., Disp: 100 each, Rfl: 3  •  HYDROcodone-acetaminophen (NORCO) 5-325 MG per tablet,  Take 1 tablet by mouth., Disp: , Rfl:   •  insulin glargine (LANTUS) 100 UNIT/ML injection, Inject 20 Units under the skin 2 (Two) Times a Day. (Patient taking differently: Inject 10 Units under the skin Every Night.), Disp: 1 each, Rfl: 12  •  Lancet Devices (ACCU-CHEK SOFTCLIX) lancets, Test bid, Disp: 100 each, Rfl: 3  •  Multiple Vitamins-Minerals (MULTIVITAMIN ADULT PO), Take  by mouth., Disp: , Rfl:   •  Omega-3 Fatty Acids (FISH OIL) 1000 MG capsule capsule, Take  by mouth daily with breakfast., Disp: , Rfl:   •  ondansetron (ZOFRAN) 8 MG tablet, Take  by mouth Every 8 (Eight) Hours As Needed for Nausea or Vomiting., Disp: , Rfl:   •  potassium chloride ER (K-TAB) 20 MEQ tablet controlled-release ER tablet, Take 20 mEq by mouth 2 (Two) Times a Day., Disp: , Rfl:   •  pravastatin (PRAVACHOL) 80 MG tablet, TAKE 1 TABLET EVERY DAY, Disp: 90 tablet, Rfl: 3  •  promethazine (PHENERGAN) 25 MG tablet, Take 25 mg by mouth Every 6 (Six) Hours As Needed for Nausea or Vomiting., Disp: , Rfl:   •  raNITIdine (ZANTAC) 150 MG tablet, Take 150 mg by mouth 2 (Two) Times a Day., Disp: , Rfl:   •  tiotropium (SPIRIVA) 18 MCG per inhalation capsule, daily., Disp: , Rfl:   •  vitamin B-12 (CYANOCOBALAMIN) 100 MCG tablet, Take 100 mcg by mouth Daily., Disp: , Rfl:   •  vitamin E 400 UNIT capsule, Take 400 Units by mouth daily., Disp: , Rfl:   •  warfarin (COUMADIN) 2 MG tablet, Take 1 tablet by mouth Daily., Disp: 90 tablet, Rfl: 1  •  warfarin (COUMADIN) 5 MG tablet, Currently on 7mg daily, mgmt by Cardiology, Disp: 90 tablet, Rfl: 1  •  urea (CARMOL) 40 % cream, Apply  topically Daily. To both feet, Disp: 198 g, Rfl: 5

## 2017-10-17 ENCOUNTER — TELEPHONE (OUTPATIENT)
Dept: FAMILY MEDICINE CLINIC | Facility: CLINIC | Age: 72
End: 2017-10-17

## 2017-10-17 RX ORDER — CARVEDILOL 6.25 MG/1
6.25 TABLET ORAL DAILY
Qty: 90 TABLET | Refills: 1 | Status: SHIPPED | OUTPATIENT
Start: 2017-10-17

## 2017-10-18 ENCOUNTER — TELEPHONE (OUTPATIENT)
Dept: FAMILY MEDICINE CLINIC | Facility: CLINIC | Age: 72
End: 2017-10-18

## 2017-10-18 NOTE — TELEPHONE ENCOUNTER
MUNDO Miller I spoke with Pastor from [facility] and discontinued the cardizem, per your request/pts request. He said Dr. Braxton Kohli just refilled a 90 day supply of this on 9/28/17, but he has removed this med from the pts profile.

## 2017-11-02 ENCOUNTER — TELEPHONE (OUTPATIENT)
Dept: FAMILY MEDICINE CLINIC | Facility: CLINIC | Age: 72
End: 2017-11-02

## 2017-11-02 NOTE — TELEPHONE ENCOUNTER
On lasix 80mg bid and gets up all day and night and needs [facility] called and see if they can evaluate for more care.  Dianne would like [facility] called and he is very angry that he thinks she doesn't want to care for him and asked her to put him in a nursing home.  She asked me to stop by her house and talk to him about [facility].  He is very nauseated and in a lot of pain and she said he is not eating any more.  He is supposed to get chemo tomorrow and mri next Tuesday. amanda

## 2017-11-03 ENCOUNTER — TELEPHONE (OUTPATIENT)
Dept: FAMILY MEDICINE CLINIC | Facility: CLINIC | Age: 72
End: 2017-11-03

## 2017-11-03 NOTE — TELEPHONE ENCOUNTER
Spoke with wife, in ER;  Reviewed labs and cxr;  ER physician told her going to admit for CAP.    Suggested s/w  about rehab placement as patient not able to stand or walk.